# Patient Record
Sex: MALE | Race: WHITE | NOT HISPANIC OR LATINO | Employment: FULL TIME | URBAN - METROPOLITAN AREA
[De-identification: names, ages, dates, MRNs, and addresses within clinical notes are randomized per-mention and may not be internally consistent; named-entity substitution may affect disease eponyms.]

---

## 2022-07-15 ENCOUNTER — APPOINTMENT (EMERGENCY)
Dept: CT IMAGING | Facility: HOSPITAL | Age: 37
End: 2022-07-15
Payer: COMMERCIAL

## 2022-07-15 ENCOUNTER — HOSPITAL ENCOUNTER (EMERGENCY)
Facility: HOSPITAL | Age: 37
Discharge: HOME | End: 2022-07-15
Attending: EMERGENCY MEDICINE
Payer: COMMERCIAL

## 2022-07-15 VITALS
SYSTOLIC BLOOD PRESSURE: 109 MMHG | DIASTOLIC BLOOD PRESSURE: 79 MMHG | RESPIRATION RATE: 16 BRPM | WEIGHT: 235.01 LBS | TEMPERATURE: 98.1 F | HEART RATE: 88 BPM | OXYGEN SATURATION: 99 %

## 2022-07-15 DIAGNOSIS — S01.81XA FACIAL LACERATION, INITIAL ENCOUNTER: Primary | ICD-10-CM

## 2022-07-15 PROCEDURE — G1004 CDSM NDSC: HCPCS

## 2022-07-15 PROCEDURE — 99283 EMERGENCY DEPT VISIT LOW MDM: CPT

## 2022-07-15 PROCEDURE — 99284 EMERGENCY DEPT VISIT MOD MDM: CPT

## 2022-07-15 PROCEDURE — 12013 RPR F/E/E/N/L/M 2.6-5.0 CM: CPT

## 2022-07-15 PROCEDURE — 70450 CT HEAD/BRAIN W/O DYE: CPT

## 2022-07-15 PROCEDURE — 70486 CT MAXILLOFACIAL W/O DYE: CPT

## 2022-07-15 RX ORDER — CEPHALEXIN 500 MG/1
500 CAPSULE ORAL EVERY 6 HOURS SCHEDULED
Qty: 28 CAPSULE | Refills: 0 | Status: SHIPPED | OUTPATIENT
Start: 2022-07-15 | End: 2022-07-22

## 2022-07-15 RX ORDER — LIDOCAINE HYDROCHLORIDE 10 MG/ML
5 INJECTION, SOLUTION EPIDURAL; INFILTRATION; INTRACAUDAL; PERINEURAL ONCE
Status: COMPLETED | OUTPATIENT
Start: 2022-07-15 | End: 2022-07-15

## 2022-07-15 RX ADMIN — LIDOCAINE HYDROCHLORIDE 5 ML: 10 INJECTION, SOLUTION EPIDURAL; INFILTRATION; INTRACAUDAL at 14:00

## 2022-07-15 NOTE — ED PROVIDER NOTES
History  Chief Complaint   Patient presents with    Head Injury     Pt reports falling and hitting head with no LOC at about 2330 on 7/14  Laceration to left brow noted, bleeding controlled  Pt reports last tetanus in the last 5 years  This is a 77-year-old male who presents after sustaining a left eyebrow laceration last night around 11 30  Patient reports that he was lying down for most the day due to recently being diagnosed with COVID and not feeling well, he then stood up and felt dizzy, tried to walk and tripped over his dog and lost his balance  He fell and hit his head on the corner of the coffee table  He reports he was lying down for about 3-4 hours prior to standing up  He denies losing consciousness  He denies any changes in vision, nausea or vomiting  No change in mental status  Patient reports that he does have a history of becoming dizzy or lightheaded when he stands up too fast  He reports he did not come in last night because it was too late  He was able to control the bleeding at home  None       History reviewed  No pertinent past medical history  Past Surgical History:   Procedure Laterality Date    FOREARM FRACTURE SURGERY Right     HAGLAND'S DEFORMITY EXCISION Bilateral        History reviewed  No pertinent family history  I have reviewed and agree with the history as documented  E-Cigarette/Vaping    E-Cigarette Use Former User      E-Cigarette/Vaping Substances    THC Yes      Social History     Tobacco Use    Smoking status: Never Smoker    Smokeless tobacco: Never Used   Vaping Use    Vaping Use: Former    Substances: THC   Substance Use Topics    Alcohol use: Yes     Comment: rarely    Drug use: Not Currently       Review of Systems   Constitutional: Negative for chills and fever  HENT: Negative for ear pain and sore throat  Eyes: Negative for pain and visual disturbance  Respiratory: Negative for cough and shortness of breath  Cardiovascular: Negative for chest pain and palpitations  Gastrointestinal: Negative for abdominal pain, diarrhea, nausea and vomiting  Genitourinary: Negative for dysuria and hematuria  Musculoskeletal: Negative for arthralgias and back pain  Skin: Positive for wound  Negative for color change and rash  Neurological: Positive for dizziness  Negative for seizures, syncope, light-headedness and headaches  All other systems reviewed and are negative  Physical Exam  Physical Exam  Vitals and nursing note reviewed  Constitutional:       General: He is not in acute distress  Appearance: Normal appearance  He is well-developed  He is not ill-appearing  HENT:      Head: Normocephalic and atraumatic  Eyes:      Conjunctiva/sclera: Conjunctivae normal    Cardiovascular:      Rate and Rhythm: Normal rate and regular rhythm  Heart sounds: No murmur heard  Pulmonary:      Effort: Pulmonary effort is normal  No respiratory distress  Breath sounds: Normal breath sounds  Abdominal:      Palpations: Abdomen is soft  Tenderness: There is no abdominal tenderness  Musculoskeletal:         General: No swelling or tenderness  Normal range of motion  Cervical back: Normal range of motion and neck supple  Skin:     General: Skin is warm and dry  Capillary Refill: Capillary refill takes less than 2 seconds  Findings: Signs of injury and laceration present  Neurological:      General: No focal deficit present  Mental Status: He is alert and oriented to person, place, and time  Cranial Nerves: No cranial nerve deficit  Sensory: No sensory deficit  Motor: No weakness        Coordination: Coordination normal    Psychiatric:         Mood and Affect: Mood normal          Behavior: Behavior normal          Vital Signs  ED Triage Vitals [07/15/22 1151]   Temperature Pulse Respirations Blood Pressure SpO2   98 1 °F (36 7 °C) 79 14 133/73 97 %      Temp Source Heart Rate Source Patient Position - Orthostatic VS BP Location FiO2 (%)   Oral Monitor Sitting Left arm --      Pain Score       2           Vitals:    07/15/22 1305 07/15/22 1306 07/15/22 1308 07/15/22 1324   BP: 129/89 115/86 109/79    Pulse: 57 78 89 88   Patient Position - Orthostatic VS: Lying - Orthostatic VS Sitting - Orthostatic VS Standing - Orthostatic VS          Visual Acuity      ED Medications  Medications   lidocaine (PF) (XYLOCAINE-MPF) 1 % injection 5 mL (5 mL Infiltration Given by Other 7/15/22 1400)       Diagnostic Studies  Results Reviewed     None                 CT facial bones without contrast   Final Result by Karlos Ritchie DO (07/15 1341)      Soft tissue injury with skin laceration and small soft tissue contusion immediately above the left orbit within the prefrontal soft tissues with no underlying osseous abnormality  Workstation performed: [id number]         CT head without contrast   Final Result by Karlos Ritchie DO (07/15 1339)      No acute intracranial abnormality  Workstation performed: [id number]                    Procedures  Laceration repair    Date/Time: 7/15/2022 2:27 PM  Performed by: Tee Flanagan PA-C  Authorized by: Tee Flanagan PA-C   Consent: Verbal consent obtained    Risks and benefits: risks, benefits and alternatives were discussed  Consent given by: patient  Patient understanding: patient states understanding of the procedure being performed  Patient identity confirmed: verbally with patient  Body area: head/neck  Location details: left eyelid  Laceration length: 4 cm  Foreign bodies: no foreign bodies  Anesthesia: local infiltration    Anesthesia:  Local Anesthetic: lidocaine 1% without epinephrine    Sedation:  Patient sedated: no        Procedure Details:  Irrigation solution: saline  Irrigation method: syringe  Amount of cleaning: standard  Skin closure: 4-0 nylon  Number of sutures: 5  Technique: simple  Approximation: close  Approximation difficulty: simple  Patient tolerance: patient tolerated the procedure well with no immediate complications               ED Course  ED Course as of 07/15/22 1437   Fri Jul 15, 2022   1240 Long discussion with pt regarding his dizzy spell and not feeling well  I recommended that pt have basic blood work, ambulatory pulse ox and orthostatic vital signs  Pt declined blood work but agreed to ambulatory pulse ox and orthostatic vital signs  1414 CT facial bones without contrast  Soft tissue injury with skin laceration and small soft tissue contusion immediately above the left orbit within the prefrontal soft tissues with no underlying osseous abnormality  1415 CT head without contrast  No acute intracranial abnormality  1 Pt thinks he has been updated on tetanus in past 5 years  On review of chart, unable to find any documentation  Pt is also allergic to Pertussis vaccine and would require Td vaccine- which we do not have  Recommended pt call PCP to find out if he is updated and if not, receive the immunization                               SBIRT 22yo+    Flowsheet Row Most Recent Value   SBIRT (23 yo +)    In order to provide better care to our patients, we are screening all of our patients for alcohol and drug use  Would it be okay to ask you these screening questions? No Filed at: 07/15/2022 1238                    MDM  Number of Diagnoses or Management Options  Facial laceration, initial encounter  Diagnosis management comments: 75-year-old male presents after sustaining a laceration to left eyebrow  Deep laceration about 4cm long  Laceration was repaired with no complications  CT of head and facial bones performed- no acute fractures or intracranial abnormalities  I did suggest basic lab work due to dizziness however pt declined  We discussed the risks of this   Pt did agree to orthostatic vital signs and ambulatory pulse ox which was normal  Basic wound care instructions given to pt  I have discussed the plan to discharge pt from ED  The patient was discharged in stable condition   Patient ambulated off the department   Extensive return to emergency department precautions were discussed   Follow up with appropriate providers including primary care physician was discussed   Patient and/or their  primary decision maker expressed understanding  Angela Shearer remained stable during entire emergency department stay  Amount and/or Complexity of Data Reviewed  Tests in the radiology section of CPT®: ordered and reviewed  Decide to obtain previous medical records or to obtain history from someone other than the patient: yes  Review and summarize past medical records: yes    Patient Progress  Patient progress: stable      Disposition  Final diagnoses:   Facial laceration, initial encounter     Time reflects when diagnosis was documented in both MDM as applicable and the Disposition within this note     Time User Action Codes Description Comment    7/15/2022  2:18 PM Glenn West Add [[id number]] Facial laceration, initial encounter       ED Disposition     ED Disposition   Discharge    Condition   Stable    Date/Time   Fri Jul 15, 2022  2:15 PM    Comment   Illene Ek discharge to home/self care  Follow-up Information     Follow up With Specialties Details Why [facility] Emergency Department Emergency Medicine  If symptoms worsen [facility] [id number]  [address] [place] [address] Emergency Department, [address] [place] [address], [id number]          Discharge Medication List as of 7/15/2022  2:20 PM      START taking these medications    Details   cephalexin (KEFLEX) 500 mg capsule Take 1 capsule (500 mg total) by mouth every 6 (six) hours for 7 days, Starting Fri 7/15/2022, Until Fri 7/22/2022, Normal             No discharge procedures on file      PDMP Review     None          ED Provider  Electronically Signed by           Jaleel Adkins PA-C  07/15/22 9580

## 2022-07-15 NOTE — DISCHARGE INSTRUCTIONS
-have sutures removed in 5-7 days   -please check with family doctor regarding tetanus booster, if you are not up to date, please schedule appt to get booster